# Patient Record
Sex: FEMALE | Race: AMERICAN INDIAN OR ALASKA NATIVE
[De-identification: names, ages, dates, MRNs, and addresses within clinical notes are randomized per-mention and may not be internally consistent; named-entity substitution may affect disease eponyms.]

---

## 2017-04-05 ENCOUNTER — HOSPITAL ENCOUNTER (INPATIENT)
Dept: HOSPITAL 31 - C.ER | Age: 47
LOS: 6 days | Discharge: HOME | DRG: 744 | End: 2017-04-11
Attending: PSYCHIATRY & NEUROLOGY | Admitting: PSYCHIATRY & NEUROLOGY
Payer: MEDICAID

## 2017-04-05 VITALS — OXYGEN SATURATION: 98 %

## 2017-04-05 VITALS — BODY MASS INDEX: 22.2 KG/M2

## 2017-04-05 DIAGNOSIS — F41.9: ICD-10-CM

## 2017-04-05 DIAGNOSIS — F14.90: ICD-10-CM

## 2017-04-05 DIAGNOSIS — B19.10: ICD-10-CM

## 2017-04-05 DIAGNOSIS — F13.90: ICD-10-CM

## 2017-04-05 DIAGNOSIS — F11.23: Primary | ICD-10-CM

## 2017-04-05 DIAGNOSIS — F17.210: ICD-10-CM

## 2017-04-05 DIAGNOSIS — F12.90: ICD-10-CM

## 2017-04-05 DIAGNOSIS — F60.9: ICD-10-CM

## 2017-04-05 DIAGNOSIS — R45.851: ICD-10-CM

## 2017-04-05 DIAGNOSIS — J45.909: ICD-10-CM

## 2017-04-05 DIAGNOSIS — B00.9: ICD-10-CM

## 2017-04-05 DIAGNOSIS — Z95.1: ICD-10-CM

## 2017-04-05 DIAGNOSIS — F32.9: ICD-10-CM

## 2017-04-05 LAB
ALBUMIN/GLOB SERPL: 1.4 {RATIO} (ref 1–2.1)
ALP SERPL-CCNC: 71 U/L (ref 38–126)
ALT SERPL-CCNC: 31 U/L (ref 9–52)
AST SERPL-CCNC: 43 U/L (ref 14–36)
BACTERIA #/AREA URNS HPF: (no result) /[HPF]
BASOPHILS # BLD AUTO: 0.1 K/UL (ref 0–0.2)
BASOPHILS NFR BLD: 1 % (ref 0–2)
BILIRUB SERPL-MCNC: 0.5 MG/DL (ref 0.2–1.3)
BILIRUB UR-MCNC: NEGATIVE MG/DL
BUN SERPL-MCNC: 14 MG/DL (ref 7–17)
CALCIUM SERPL-MCNC: 8.8 MG/DL (ref 8.6–10.4)
CHLORIDE SERPL-SCNC: 98 MMOL/L (ref 98–107)
CO2 SERPL-SCNC: 27 MMOL/L (ref 22–30)
EOSINOPHIL # BLD AUTO: 0.1 K/UL (ref 0–0.7)
EOSINOPHIL NFR BLD: 1.6 % (ref 0–4)
ERYTHROCYTE [DISTWIDTH] IN BLOOD BY AUTOMATED COUNT: 13.6 % (ref 11.5–14.5)
ETHANOL SERPL-MCNC: < 10 MG/DL (ref 0–10)
GLOBULIN SER-MCNC: 3.2 GM/DL (ref 2.2–3.9)
GLUCOSE SERPL-MCNC: 103 MG/DL (ref 65–105)
GLUCOSE UR STRIP-MCNC: NORMAL MG/DL
HCT VFR BLD CALC: 38.9 % (ref 34–47)
KETONES UR STRIP-MCNC: NEGATIVE MG/DL
LEUKOCYTE ESTERASE UR-ACNC: (no result) LEU/UL
LYMPHOCYTES # BLD AUTO: 1.7 K/UL (ref 1–4.3)
LYMPHOCYTES NFR BLD AUTO: 27.6 % (ref 20–40)
MCH RBC QN AUTO: 27 PG (ref 27–31)
MCHC RBC AUTO-ENTMCNC: 32.9 G/DL (ref 33–37)
MCV RBC AUTO: 82.2 FL (ref 81–99)
MONOCYTES # BLD: 0.7 K/UL (ref 0–0.8)
MONOCYTES NFR BLD: 10.4 % (ref 0–10)
NRBC BLD AUTO-RTO: 0 % (ref 0–2)
PH UR STRIP: 5 [PH] (ref 5–8)
PLATELET # BLD: 195 K/UL (ref 130–400)
PMV BLD AUTO: 10.5 FL (ref 7.2–11.7)
POTASSIUM SERPL-SCNC: 3.9 MMOL/L (ref 3.6–5.2)
PROT SERPL-MCNC: 7.6 G/DL (ref 6.3–8.3)
PROT UR STRIP-MCNC: NEGATIVE MG/DL
RBC # UR STRIP: (no result) /UL
RBC #/AREA URNS HPF: 2 /HPF (ref 0–3)
SODIUM SERPL-SCNC: 140 MMOL/L (ref 132–148)
SP GR UR STRIP: 1.01 (ref 1–1.03)
UROBILINOGEN UR-MCNC: NORMAL MG/DL (ref 0.2–1)
WBC # BLD AUTO: 6.3 K/UL (ref 4.8–10.8)
WBC #/AREA URNS HPF: 22 /HPF (ref 0–5)

## 2017-04-05 NOTE — C.PDOC
History Of Present Illness


46 year old female presents to the ED via EMS for drug abuse and public 

intoxication. Patient admits to using Cocaine and Heroin today and has no 

physical complaints at this time


Time Seen by Provider: 04/05/17 03:47


Chief Complaint (Nursing): Substance Abuse


History Per: Patient, EMS


History/Exam Limitations: no limitations


Onset/Duration Of Symptoms: Hrs


Current Symptoms Are (Timing): Still Present


Suicide/Self Injury Attempted (Context): None


Modifying Factor(s): Alcohol, Cocaine, Other (Heroin)


Severity: Mild





Past Medical History


Reviewed: Historical Data, Nursing Documentation, Vital Signs


Vital Signs: 


 Last Vital Signs











Temp  97.6 F   04/05/17 05:55


 


Pulse  57 L  04/05/17 05:55


 


Resp  16   04/05/17 05:55


 


BP  144/79   04/05/17 05:55


 


Pulse Ox  96   04/05/17 06:52














- Medical History


PMH: Asthma, Bipolar Disorder, Depression


Surgical History: CABG





- [facility] Procedures








DETOXIFICATION SERVICES FOR SUBSTANCE ABUSE TREATMENT (01/27/17)


GROUP  FOR SUBSTANCE ABUSE TREATMENT, PSYCHOEDUCATION (12/04/16)


GROUP PSYCHOTHERAPY (01/27/17)


INDIVIDUAL PSYCHOTHERAPY, SUPPORTIVE (01/27/17)


INJECT/INFUSE NEC (01/14/13)


NEBULIZER THERAPY (01/14/13)


VENOUS PUNCTURE NEC (01/14/13)








Family History: States: Unknown Family Hx





- Social History


Hx Tobacco Use: Yes


Hx Alcohol Use: Yes


Hx Substance Use: Yes





- Immunization History


Hx Tetanus Toxoid Vaccination: No


Hx Influenza Vaccination: No


Hx Pneumococcal Vaccination: No





Review Of Systems


Except As Marked, All Systems Reviewed And Found Negative.


Constitutional: Positive for: Other (+Drug use +Intoxication).  Negative for: 

Fever, Chills


Cardiovascular: Negative for: Chest Pain


Respiratory: Negative for: Shortness of Breath


Gastrointestinal: Negative for: Vomiting, Diarrhea





Physical Exam





- Physical Exam


Appears: Non-toxic, No Acute Distress, Unkempt


Skin: Normal Color, Warm, Dry


Head: Atraumatic, Normacephalic


Eye(s): bilateral: Normal Inspection


Oral Mucosa: Moist


Neck: No Midline Cervical Tenderness, No Paracervical Tenderness, Supple


Chest: Symmetrical


Cardiovascular: Rhythm Regular, No Friction Rub, No Murmur


Respiratory: Normal Breath Sounds, No Accessory Muscle Use, No Rales, No Rhonchi

, No Wheezing


Gastrointestinal/Abdominal: Normal Exam, Soft, No Tenderness


Back: Normal Inspection


Extremity: Normal ROM, No Deformity, No Swelling


Neurological/Psych: Oriented x3, Normal Speech, Normal Motor


Gait: Steady





ED Course And Treatment


O2 Sat by Pulse Oximetry: 96 (Room air)


Pulse Ox Interpretation: Normal





Medical Decision Making


Medical Decision Making: 


Upon reevaluation, patient is now awake and sober. She state she tried to jump 

in front of a bus and still has suicidal ideation.


Case discussed with the crisis care manager and patient placed on 1:1.





Plan:


-Blood work


-Urinalysis








Disposition





- Disposition


Referrals: 


Non [facility] Provider, [Primary Care Provider] - 


Disposition Time: 06:51


Condition: STABLE





- Clinical Impression


Clinical Impression: 


 Drug abuse, Suicidal ideation, Depression





- PA / NP / Resident Statement


MD/DO has reviewed & agrees with the documentation as recorded.





- Scribe Statement


The provider has reviewed the documentation as recorded by the Scribe


Louann Bull.





All medical record entries made by the Scribe were at my direction and 

personally dictated by me. I have reviewed the chart and agree that the record 

accurately reflects my personal performance of the history, physical exam, 

medical decision making, and the department course for this patient. I have 

also personally directed, reviewed, and agree with the discharge instructions 

and disposition.





Physician Patient Turnover


Patient Signed Over To: Nancy Saravia (Dr. lu)


Handoff Comments: Pending labs and disposition

## 2017-04-06 PROCEDURE — HZ42ZZZ GROUP COUNSELING FOR SUBSTANCE ABUSE TREATMENT, COGNITIVE-BEHAVIORAL: ICD-10-PCS | Performed by: PSYCHIATRY & NEUROLOGY

## 2017-04-06 PROCEDURE — HZ59ZZZ INDIVIDUAL PSYCHOTHERAPY FOR SUBSTANCE ABUSE TREATMENT, SUPPORTIVE: ICD-10-PCS | Performed by: PSYCHIATRY & NEUROLOGY

## 2017-04-06 PROCEDURE — HZ2ZZZZ DETOXIFICATION SERVICES FOR SUBSTANCE ABUSE TREATMENT: ICD-10-PCS | Performed by: PSYCHIATRY & NEUROLOGY

## 2017-04-06 PROCEDURE — HZ36ZZZ INDIVIDUAL COUNSELING FOR SUBSTANCE ABUSE TREATMENT, PSYCHOEDUCATION: ICD-10-PCS | Performed by: PSYCHIATRY & NEUROLOGY

## 2017-04-06 PROCEDURE — HZ46ZZZ GROUP COUNSELING FOR SUBSTANCE ABUSE TREATMENT, PSYCHOEDUCATION: ICD-10-PCS | Performed by: PSYCHIATRY & NEUROLOGY

## 2017-04-06 PROCEDURE — HZ32ZZZ INDIVIDUAL COUNSELING FOR SUBSTANCE ABUSE TREATMENT, COGNITIVE-BEHAVIORAL: ICD-10-PCS | Performed by: PSYCHIATRY & NEUROLOGY
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
## 2017-04-07 RX ADMIN — GUAIFENESIN PRN MG: 100 SOLUTION ORAL at 12:50

## 2017-04-07 RX ADMIN — GUAIFENESIN PRN MG: 100 SOLUTION ORAL at 18:36

## 2017-04-08 RX ADMIN — GUAIFENESIN PRN MG: 100 SOLUTION ORAL at 09:49

## 2017-04-08 RX ADMIN — GUAIFENESIN PRN MG: 100 SOLUTION ORAL at 00:17

## 2017-04-08 RX ADMIN — GUAIFENESIN PRN MG: 100 SOLUTION ORAL at 02:10

## 2017-04-08 RX ADMIN — GUAIFENESIN PRN MG: 100 SOLUTION ORAL at 20:46

## 2017-04-08 NOTE — PCM.PYCHPN
Psychiatric Progress Note





- Psychiatric Progress Note


Patient seen today, length of contact: 17 min


Patient Chief Complaint: 


"I was depressed"


Problems Identified/Issues Discussed: 


The pt is seen, chart reviewed, case discussed with staff.


The pt is compliant with medications and reports no side-effects.


Symptoms are improving but needs more time to stabilize. Still anxious, frail, 

med-seeking


After care discussed, support and psychoeducation given.


MI and CBT used briefly.


Medication Change: Yes (detox changes daily)


Medical Record Reviewed: Yes





Mental Status Examination





- Cognitive Function


Orientation: Person, Place, Situation, Time


Memory: Impaired


Attention: Poor


Concentration: Poor


Association: WNL


Fund of Knowledge: Poor





- Mood


Mood: Depressed, Anxious





- Affect


Affect: Constricted





- Speech


Speech: Slurred





- Formal Thought Process


Formal Thought Process: Loosening of associations





- Suicidal Ideation


Suicidal Ideation: No





- Homicidal Ideation


Homicidal Ideation: No





Goal/Treatment Plan





- Goal/Treatment Plan


Need for Continued Stay: Discharge may exacerbated symptoms, Severe functional 

impairment


Progress Toward Problem(s) and Goals/Treatment Plan: 


Depression:


-Lexapro


-Support and psychoeducation


-CBT


-Attend groups and activities





Opioids:


-Methadone detox next line-as needed medications


-Attend groups and activities


-MI for abstinence





Cannabis, benzos and cocaine: 


-gabapentin


-MI for abstinence





Estimated Date of D/C: 04/11/17

## 2017-04-08 NOTE — PCM.PYCHPN
Psychiatric Progress Note





- Psychiatric Progress Note


Patient seen today, length of contact: 17 min


Patient Chief Complaint: 





I am feeling a little better


Problems Identified/Issues Discussed: 





The pt is seen, chart reviewed, case discussed with staff.


The pt is compliant with medications and reports no side-effects.


Symptoms are improving but needs more time to stabilize. 


Pt. reports poor sleep, and she reports depressed mood and remained isolated.


After care discussed, support and psychoeducation given.  Interested in 

[facility].


Medication Change: Yes (detox changes daily)


Medical Record Reviewed: Yes





Mental Status Examination





- Cognitive Function


Orientation: Person, Place, Situation, Time


Memory: Impaired


Attention: WNL


Concentration: WNL


Association: WNL


Fund of Knowledge: Poor





- Mood


Mood: Depressed, Anxious





- Affect


Affect: Constricted





- Speech


Speech: Slurred





- Formal Thought Process


Formal Thought Process: No Impairment





- Suicidal Ideation


Suicidal Ideation: No





- Homicidal Ideation


Homicidal Ideation: No





Goal/Treatment Plan





- Goal/Treatment Plan


Need for Continued Stay: Discharge may exacerbated symptoms, Severe functional 

impairment


Progress Toward Problem(s) and Goals/Treatment Plan: 


Primary: Major Depressive Disorder - single/severe without psychosis


Opioid use d/o - severe


Opioid withdrawal


Cocaine use d/o - moderate


Cannabis Use d/o - mild


Sedative hypnotic use d/o - mild


r/o personality d/o unspecified











Depression:


-Lexapro


-Support and psychoeducation


-CBT


-Attend groups and activities





Opioids:


-Methadone detox next line-as needed medications


-Attend groups and activities


-MI for abstinence





Cannabis, benzos and cocaine: 


-gabapentin


-MI for abstinence


Estimated Date of D/C: 04/11/17





- Smoking Cessation


Smoking Cessation Initiated: No

## 2017-04-09 RX ADMIN — GUAIFENESIN PRN MG: 100 SOLUTION ORAL at 10:26

## 2017-04-09 RX ADMIN — GUAIFENESIN PRN MG: 100 SOLUTION ORAL at 14:13

## 2017-04-09 NOTE — PCM.PYCHPN
Psychiatric Progress Note





- Psychiatric Progress Note


Patient seen today, length of contact: 15 min


Patient Chief Complaint: 





I am feeling much better


Problems Identified/Issues Discussed: 





The pt is seen, chart reviewed, case discussed with staff.


The pt is compliant with medications and reports no side-effects.


Pt. reports improvement in her sleep and depressed mood. Her symptoms are 

improving but needs more time to stabilize. 


After care discussed, support and psychoeducation given.  Interested in 

[facility].


Medication Change: Yes (detox changes daily)


Medical Record Reviewed: Yes





Mental Status Examination





- Cognitive Function


Orientation: Person, Place, Situation, Time


Memory: Impaired


Attention: WNL


Concentration: WNL


Association: WNL


Fund of Knowledge: WNL





- Mood


Mood: Anxious





- Affect


Affect: Constricted





- Speech


Speech: Soft





- Formal Thought Process


Formal Thought Process: No Impairment





- Suicidal Ideation


Suicidal Ideation: No





- Homicidal Ideation


Homicidal Ideation: No





Goal/Treatment Plan





- Goal/Treatment Plan


Need for Continued Stay: Discharge may exacerbated symptoms, Severe functional 

impairment, Other


Progress Toward Problem(s) and Goals/Treatment Plan: 


Primary: Major Depressive Disorder - single/severe without psychosis


Opioid use d/o - severe


Opioid withdrawal


Cocaine use d/o - moderate


Cannabis Use d/o - mild


Sedative hypnotic use d/o - mild


r/o personality d/o unspecified











Depression:


-Lexapro


-Support and psychoeducation


-CBT


-Attend groups and activities





Opioids:


-Methadone detox next line-as needed medications


-Attend groups and activities


-MI for abstinence





Cannabis, benzos and cocaine: 


-gabapentin


-MI for abstinence


Estimated Date of D/C: 04/11/17





- Smoking Cessation


Smoking Cessation Initiated: No

## 2017-04-10 RX ADMIN — GUAIFENESIN PRN MG: 100 SOLUTION ORAL at 21:04

## 2017-04-10 RX ADMIN — GUAIFENESIN PRN MG: 100 SOLUTION ORAL at 10:55

## 2017-04-10 NOTE — PCM.PYCHPN
Psychiatric Progress Note





- Psychiatric Progress Note


Patient seen today, length of contact: 17 min


Patient Chief Complaint: 


"I need more meds"


Problems Identified/Issues Discussed: 


The pt is seen, chart reviewed, case discussed with staff.


The pt is compliant with medications and reports no side-effects.


Symptoms are improving but needs more time to stabilize. 


Pt. reports poor sleep, and she reports feeling sluggish. Inquires about 

increasing dose of trazadone. 


After care discussed, support and psychoeducation given.  Interested in 

[facility].


Medication Change: Yes (detox changes daily, increase trazodone)


Medical Record Reviewed: Yes





Mental Status Examination





- Cognitive Function


Orientation: Person, Place, Situation, Time


Memory: Impaired


Attention: Poor


Concentration: Poor


Association: WNL


Fund of Knowledge: Poor





- Mood


Mood: Depressed, Anxious





- Affect


Affect: Constricted





- Speech


Speech: Appropriate





- Formal Thought Process


Formal Thought Process: Loosening of associations





- Suicidal Ideation


Suicidal Ideation: No





- Homicidal Ideation


Homicidal Ideation: No





Goal/Treatment Plan





- Goal/Treatment Plan


Need for Continued Stay: Discharge may exacerbated symptoms, Severe functional 

impairment


Progress Toward Problem(s) and Goals/Treatment Plan: 


Depression:


-Lexapro


-Support and psychoeducation


-CBT


-Attend groups and activities





Opioids:


-Methadone detox next line-as needed medications


-Attend groups and activities


-MI for abstinence





Cannabis, benzos and cocaine: 


-gabapentin


-MI for abstinence





Estimated Date of D/C: 04/11/17

## 2017-04-11 VITALS
HEART RATE: 74 BPM | SYSTOLIC BLOOD PRESSURE: 95 MMHG | RESPIRATION RATE: 18 BRPM | DIASTOLIC BLOOD PRESSURE: 62 MMHG | TEMPERATURE: 97.8 F
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
## 2018-05-21 ENCOUNTER — HOSPITAL ENCOUNTER (INPATIENT)
Dept: HOSPITAL 31 - C.ER | Age: 48
LOS: 8 days | Discharge: HOME | DRG: 430 | End: 2018-05-29
Attending: PSYCHIATRY & NEUROLOGY | Admitting: PSYCHIATRY & NEUROLOGY
Payer: COMMERCIAL

## 2018-05-21 VITALS — BODY MASS INDEX: 22.2 KG/M2

## 2018-05-21 DIAGNOSIS — Z95.1: ICD-10-CM

## 2018-05-21 DIAGNOSIS — F11.23: ICD-10-CM

## 2018-05-21 DIAGNOSIS — I10: ICD-10-CM

## 2018-05-21 DIAGNOSIS — Z95.2: ICD-10-CM

## 2018-05-21 DIAGNOSIS — F31.64: Primary | ICD-10-CM

## 2018-05-21 DIAGNOSIS — R45.851: ICD-10-CM

## 2018-05-21 DIAGNOSIS — F12.90: ICD-10-CM

## 2018-05-21 DIAGNOSIS — F14.20: ICD-10-CM

## 2018-05-21 DIAGNOSIS — L30.9: ICD-10-CM

## 2018-05-21 DIAGNOSIS — J45.909: ICD-10-CM

## 2018-05-21 DIAGNOSIS — Z91.14: ICD-10-CM

## 2018-05-21 LAB
ALBUMIN SERPL-MCNC: 4.3 G/DL (ref 3.5–5)
ALBUMIN/GLOB SERPL: 1.3 {RATIO} (ref 1–2.1)
ALT SERPL-CCNC: 26 U/L (ref 9–52)
AST SERPL-CCNC: 23 U/L (ref 14–36)
BASOPHILS # BLD AUTO: 0.1 K/UL (ref 0–0.2)
BASOPHILS NFR BLD: 1.1 % (ref 0–2)
BILIRUB UR-MCNC: NEGATIVE MG/DL
BUN SERPL-MCNC: 20 MG/DL (ref 7–17)
CALCIUM SERPL-MCNC: 9.5 MG/DL (ref 8.6–10.4)
EOSINOPHIL # BLD AUTO: 0.1 K/UL (ref 0–0.7)
EOSINOPHIL NFR BLD: 2 % (ref 0–4)
ERYTHROCYTE [DISTWIDTH] IN BLOOD BY AUTOMATED COUNT: 13.5 % (ref 11.5–14.5)
GFR NON-AFRICAN AMERICAN: 59
GLUCOSE UR STRIP-MCNC: NORMAL MG/DL
HCG,QUALITATIVE URINE: NEGATIVE
HGB BLD-MCNC: 12.7 G/DL (ref 11–16)
HYALINE CASTS #/AREA URNS LPF: (no result) /LPF (ref 0–2)
LEUKOCYTE ESTERASE UR-ACNC: (no result) LEU/UL
LYMPHOCYTES # BLD AUTO: 1.7 K/UL (ref 1–4.3)
LYMPHOCYTES NFR BLD AUTO: 23.7 % (ref 20–40)
MCH RBC QN AUTO: 28.5 PG (ref 27–31)
MCHC RBC AUTO-ENTMCNC: 34.1 G/DL (ref 33–37)
MCV RBC AUTO: 83.4 FL (ref 81–99)
MONOCYTES # BLD: 0.7 K/UL (ref 0–0.8)
MONOCYTES NFR BLD: 9.6 % (ref 0–10)
NEUTROPHILS # BLD: 4.6 K/UL (ref 1.8–7)
NEUTROPHILS NFR BLD AUTO: 63.6 % (ref 50–75)
NRBC BLD AUTO-RTO: 0 % (ref 0–2)
PH UR STRIP: 5 [PH] (ref 5–8)
PLATELET # BLD: 138 K/UL (ref 130–400)
PMV BLD AUTO: 11.1 FL (ref 7.2–11.7)
PROT UR STRIP-MCNC: NEGATIVE MG/DL
RBC # BLD AUTO: 4.47 MIL/UL (ref 3.8–5.2)
RBC # UR STRIP: NEGATIVE /UL
SP GR UR STRIP: 1.01 (ref 1–1.03)
SQUAMOUS EPITHIAL: 1 /HPF (ref 0–5)
UROBILINOGEN UR-MCNC: NORMAL MG/DL (ref 0.2–1)
WBC # BLD AUTO: 7.3 K/UL (ref 4.8–10.8)

## 2018-05-21 NOTE — C.PDOC
History Of Present Illness


48 year old female with a long history of heroin dependency and schizophrenia 

presents to the emergency department after being dropped off by a friend with 

complaints and auditory hallucinations and voices saying "strange things". 

Patient reports she feels frightened, but denies alcohol or use suicidal 

thoughts at the moment but states she has had them in the past. Patient is 

requesting to be seen by psych, and is also requesting detox. Patient is non-

compliant with her medications. 


Chief Complaint (Nursing): Psychiatric Evaluation


History Per: Patient


History/Exam Limitations: no limitations


Onset/Duration Of Symptoms: Hrs


Current Symptoms Are (Timing): Still Present


Suicide/Self Injury Attempted (Context): None


Modifying Factor(s): Other (heroin)


Associated Symptoms: Other (fright).  denies: Suicidal Thoughts, Suicidal Plan





Past Medical History


Reviewed: Historical Data, Nursing Documentation, Vital Signs


Vital Signs: 


 Last Vital Signs











Temp  97.9 F   05/22/18 00:56


 


Pulse  82   05/22/18 00:56


 


Resp  18   05/22/18 04:14


 


BP  131/89   05/22/18 00:56


 


Pulse Ox  100   05/22/18 02:59














- Medical History


PMH: Asthma, Bipolar Disorder, Depression, HTN, Mitral Valve Prolapse, 

Schizophrenia


   Denies: Diabetes, Hepatitis, HIV, Chronic Kidney Disease, Seizures, Sexually 

Transmitted Disease


Surgical History: CABG





- [facility] Procedures








DETOXIFICATION SERVICES FOR SUBSTANCE ABUSE TREATMENT (04/05/17)


GROUP  FOR SUBSTANCE ABUSE TREATMENT, PSYCHOEDUCATION (04/05/17)


GROUP  FOR SUBSTANCE ABUSE, COGNITIVE BEHAVIORAL (04/05/17)


GROUP PSYCHOTHERAPY (01/27/17)


INDIV  FOR SUBSTANCE ABUSE TREATMENT, PSYCHOEDUCATION (04/05/17)


INDIV  FOR SUBSTANCE ABUSE, COGNITIVE BEHAVIORAL (04/05/17)


[facility] PSYCHOTHERAPY FOR SUBSTANCE ABUSE TREATMENT, SUPPORT (04/05/17)


INDIVIDUAL PSYCHOTHERAPY, SUPPORTIVE (01/27/17)


INJECT/INFUSE NEC (01/14/13)


NEBULIZER THERAPY (01/14/13)


VENOUS PUNCTURE NEC (01/14/13)








Family History: States: No Known Family Hx





- Social History


Hx Tobacco Use: Yes


Hx Alcohol Use: Yes


Hx Substance Use: Yes





- Immunization History


Hx Tetanus Toxoid Vaccination: No


Hx Influenza Vaccination: No


Hx Pneumococcal Vaccination: No





Review Of Systems


Constitutional: Positive for: Other (fright)


Neurological: Positive for: Other (auditory hallucinations)


Psych: Positive for: Anxiety.  Negative for: Suicidal ideation





Physical Exam





- Physical Exam


Appears: Non-toxic, In Acute Distress (anxious, pleading not to be left alone), 

Unkempt (poor general hygiene)


Skin: Normal Color, Warm, Dry


Head: Atraumatic, Normacephalic


Eye(s): bilateral: Normal Inspection


Oral Mucosa: Moist


Neck: Supple


Cardiovascular: Rhythm Regular


Respiratory: Normal Breath Sounds, No Rales, No Rhonchi, No Wheezing


Gastrointestinal/Abdominal: Normal Exam, Soft, No Tenderness, No Mass, No 

Guarding, No Rebound


Extremity: Other (no sign of IV drug use noted)


Extremity: Bilateral: Atraumatic


Pulses: Left Dorsalis Pedis: Normal, Right Dorsalis Pedis: Normal


Neurological/Psych: Oriented x3, Normal Speech, Normal Cognition, Other (

hyperkinetic, no focal deficits)





ED Course And Treatment





- Laboratory Results


Result Diagrams: 


 05/21/18 23:11





 05/21/18 23:11


O2 Sat by Pulse Oximetry: 100 (RA)


Pulse Ox Interpretation: Normal





Medical Decision Making


Medical Decision Making: 


Plan:


Alcohol Serum


CMP


Drug Screen


CBC


Desyrel 50mg PO


Urinalysis 





Impression:


Substance abuse, auditory hallucinations








Disposition





- Disposition


Disposition: HOSPITALIZED


Disposition Time: 05:51


Condition: GOOD





- Clinical Impression


Clinical Impression: 


 Opioid dependence, Depression








- Scribe Statement


The provider has reviewed the documentation as recorded by the Scribe (Suraj Marcelino)


Provider Attestation: 


All medical record entries made by the Scribe were at my direction and 

personally dictated by me. I have reviewed the chart and agree that the record 

accurately reflects my personal performance of the history, physical exam, 

medical decision making, and the department course for this patient. I have 

also personally directed, reviewed, and agree with the discharge instructions 

and disposition.

## 2018-05-22 PROCEDURE — GZ58ZZZ INDIVIDUAL PSYCHOTHERAPY, COGNITIVE-BEHAVIORAL: ICD-10-PCS | Performed by: PSYCHIATRY & NEUROLOGY

## 2018-05-22 PROCEDURE — HZ46ZZZ GROUP COUNSELING FOR SUBSTANCE ABUSE TREATMENT, PSYCHOEDUCATION: ICD-10-PCS | Performed by: PSYCHIATRY & NEUROLOGY

## 2018-05-22 PROCEDURE — HZ56ZZZ INDIVIDUAL PSYCHOTHERAPY FOR SUBSTANCE ABUSE TREATMENT, PSYCHOEDUCATION: ICD-10-PCS | Performed by: PSYCHIATRY & NEUROLOGY

## 2018-05-22 PROCEDURE — HZ42ZZZ GROUP COUNSELING FOR SUBSTANCE ABUSE TREATMENT, COGNITIVE-BEHAVIORAL: ICD-10-PCS | Performed by: PSYCHIATRY & NEUROLOGY

## 2018-05-22 PROCEDURE — HZ2ZZZZ DETOXIFICATION SERVICES FOR SUBSTANCE ABUSE TREATMENT: ICD-10-PCS | Performed by: PSYCHIATRY & NEUROLOGY

## 2018-05-22 PROCEDURE — HZ52ZZZ INDIVIDUAL PSYCHOTHERAPY FOR SUBSTANCE ABUSE TREATMENT, COGNITIVE-BEHAVIORAL: ICD-10-PCS | Performed by: PSYCHIATRY & NEUROLOGY

## 2018-05-22 PROCEDURE — GZ56ZZZ INDIVIDUAL PSYCHOTHERAPY, SUPPORTIVE: ICD-10-PCS | Performed by: PSYCHIATRY & NEUROLOGY

## 2018-05-22 PROCEDURE — GZHZZZZ GROUP PSYCHOTHERAPY: ICD-10-PCS | Performed by: PSYCHIATRY & NEUROLOGY

## 2018-05-22 PROCEDURE — HZ59ZZZ INDIVIDUAL PSYCHOTHERAPY FOR SUBSTANCE ABUSE TREATMENT, SUPPORTIVE: ICD-10-PCS | Performed by: PSYCHIATRY & NEUROLOGY

## 2018-05-22 NOTE — PCM.BM
<Yan Collazo - Last Filed: 05/22/18 01:43>





Treatment Plan Problems





- Problems identified on initial assessmt


  ** DEPRESSION


Date Initiated: 05/22/18


Time Initiated: 01:35


Assessment reference: NA


Status: Active





  ** SUBSTANCE ABUSE


Date Initiated: 05/22/18


Time Initiated: 01:35


Assessment reference: NA


Status: Active





Treatment assets and liabiliti


Patient Assests: adapts well, cooperative, self-reliant, ADL independent


Patient Liabilities: financial problems, poor support system, substance abuse





- Milieu Protocol


Maintain good personal hygiene: daily Encourage regular showers, daily Remind 

patient to perform daily oral care, daily Assist patient to perform ADL's


Maintain personal safety: every shift Educate patient to report safety concerns 

to staff, every shift Monitor environment for contraband/sharps


Medication safety: Monitor for expected outcome, potential side effects: every 

shift, Assess barriers to learning: every shift, Assess readiness for 

medication education: every shift





<Dee Sosa - Last Filed: 05/23/18 17:08>





Family Contact


Family involvement: Patient does not wish Family/SO involvement


Family contact: Patient declines to allow family contact at present





- Goals for Treatment


Patient goals for treatment: "I want to go anywhere that has subaxone treatment.

"



Discharge/Continuing Care





- Education Needs


Education Needs: Patient Medication, Patient Diagnosis/Disease Process, Patient 

Coping Skills, Patient Community resources





- Discharge


Discharge Criteria: Free of Suicidal thoughts, Normal sleep pattern, Ability to 

care for self, No longer exhibiting s/s of withdrawal, Reduction of target 

symptoms


Discharge to:: Home





- Treatment Team Participation


Discussed with Family/SO: No


Was Patient/Family/SO present at Treatment Team Meeting: Yes

## 2018-05-22 NOTE — PCM.PSYCH
Initial Psychiatric Evaluation





- Initial Psychiatric Evaluation


Type of Admission: Voluntary


Legal Status: Capacity


Chief Complaint (in patient's own words): 





I was feeling depressed.'


History of Present Illness and Precipitating Events: 








Pt is 48 year old AA female presented to the ED with complains of feeling 

depressed and auditory hallucinations and voices. 





The patient has a long history of heroin dependency and schizophrenia. The 

voices has been telling her yesterday to fly like batman, just fly away. 

Patient reports feeling frightened by the voices, but denies having suicidal 

thoughts. She did report having suicidal thoughts in the past however. 

Yesterday she reports having 4 bags of heroin intranasally. Normally she has 10 

bags of heroin a day. She is experiencing withdrawal symptoms including 

irritability, stomach cramps, nausea, diarrhea, and overall restless. She also 

complains of feeling itchy around her lips, neck, and arms. Patient reports 

having a depressed mood. She says he cannot sleep, doesnt have an appetite, 

cannot concentrate, feeling guilty of her actions, and not being interested in 

doing anything. She has tried rehab programs in [place] for Suboxone and in 

[place] ([facility]) for Methadone but claims she has stopped going for 

unknown reasons. She reports having using heroin for 20 years and smoking 

cocaine for 15 years. She also uses marijuana on occasion. She drinks 2 shots 

of alcohol and 2 beers usually a day. 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 

and drugs by being a prostitute. She was admitted last month with Major 

Depressive Disorder with suicidal ideations. After her last admission she went 

ot [facility] for a methadone rehab program. Patient also reports her parents 

experienced depression and anxiety. 





Past Medical History: Asthma, HTM, Mitral Valve Prolapse, 


Past Surgical History: CABG


Allergies: Denies





Current Medications: 





Active Medications











Generic Name Dose Route Start Last Admin





  Trade Name Freq  PRN Reason Stop Dose Admin


 


Albuterol  1 puff  05/22/18 04:40  





  Ventolin Hfa 90 Mcg/Actuation (8 G)  INH   





  RQ6 PRN   





  Shortness of Breath   


 


Trazodone HCl  50 mg  05/22/18 02:15  05/22/18 03:20





  Desyrel  PO   50 mg





  [facility]   Administration














Past Psychiatric History





- Past Psychiatric History


Previous Treatment History: Inpatient


Pertinent Medical Hx (Current Medical&Sleep Prob, Allergies): 





 Allergies











Allergy/AdvReac Type Severity Reaction Status Date / Time


 


FISH Allergy   Verified 04/05/17 03:39


 


mushroom Allergy  RASH Uncoded 04/05/17 03:39








 





Albuterol HFA [Ventolin HFA 90 mcg/actuation (8 g)] 1 puff INH RQ4 PRN #1 

inhaler 04/11/17 


Escitalopram [Lexapro] 10 mg PO DAILY #30 tab 04/11/17 


Gabapentin [Neurontin] 600 mg PO TID #90 cap 04/11/17 


traZODone [Desyrel] 150 mg PO HS #30 tab 04/11/17 











Review of Systems





- Review of Systems


All systems: reviewed and no additional remarkable complaints except





- Psychiatric


Psychiatric: Anxiety, Auditory Hallucinations, Irritability, Mood Swings, 

Paranoia, Suicidal Ideation





Mental Status Examination





- Personal Presentation


Personal Presentation: Looks stated age





- Affect


Affect: Broad





- Motor Activity


Motor Activity: Psychomotor Agitation





- Reliability in Providing Information


Reliability in Providing Information: Fair





- Speech


Speech: Organized





- Mood


Mood: Depressed, Anxious





- Formal Thought Process


Formal Thought Process: Hallucinations, Delusions, Paranoia, Flight of ideas





- Hallucinations/Delusions


Hallucinations: Auditory





- Obsessions/Compulsions


Obsessions: No


Compulsions: No





- Cognitive Functions


Orientation: Person, Place, Situation, Time


Sensorium: Alert


Attention/Concentration: Attentive


Abstract Thinking: [place]


Estimate of Intelligence: Below average


Judgement: Imparied, as evidence by: Poor judgement, Imparied, as evidence by: 

Lack of insight into illness





- Risk


Risk: Suicidal, Withdrawal, Diminished functioning





- Limitations


Limitations: Living alone





DSM 5 DX





- DSM 5


DSM 5 Diagnosis: 








Bipolar disorder mixed severe with psychotic features


R/O Schizoaffective disorder Bipolar type


Opioid use disorder severe 


Opioid withdrawal


Cocaine use disorder severe








- Recommended/Plan of Treatment


Treatment Recommendations and Plan of Treatment: 











Bipolar disorder mixed severe with psychotic features


R/O Schizoaffective disorder Bipolar type


-CBT   


-Psychoeducation


-Supportive therapy, group therapy, individual therapy


-Trazodone 50 mg by mouth daily at bedtime


-Gabapentin 100 mg po TID


-Lexapro 10 mg po daily





Opioid use disorder severe


-CBT


-Psychoeducation


-Supportive therapy, individual therapy


-Use MI for abstinence





Opioid withdrawal 


-CBT   


-Psychoeducation


-Supportive therapy, individual therapy


-Clonidine when necessary


-Methadone taper





Cocaine use disorder severe


-Psychoeducation


-Supportive therapy, individual therapy


-Use MI for abstinence





Skin Rash


-Consult medicine





Asthma


-Continue prescribed medications








- Smoking Cessation


Smoking Cessation Initiated: No

## 2018-05-22 NOTE — PCM.PSYCH
Initial Psychiatric Evaluation





- Initial Psychiatric Evaluation


Type of Admission: Voluntary


Legal Status: Capacity


Current Medications: 





Active Medications











Generic Name Dose Route Start Last Admin





  Trade Name Freq  PRN Reason Stop Dose Admin


 


Albuterol  1 puff  05/22/18 04:40  





  Ventolin Hfa 90 Mcg/Actuation (8 G)  INH   





  RQ6 PRN   





  Shortness of Breath   


 


Trazodone HCl  50 mg  05/22/18 02:15  05/22/18 03:20





  Desyrel  PO   50 mg





  HS MARLA   Administration














Past Psychiatric History





- Past Psychiatric History


Pertinent Medical Hx (Current Medical&Sleep Prob, Allergies): 





 Allergies











Allergy/AdvReac Type Severity Reaction Status Date / Time


 


FISH Allergy   Verified 04/05/17 03:39


 


mushroom Allergy  RASH Uncoded 04/05/17 03:39








 





Albuterol HFA [Ventolin HFA 90 mcg/actuation (8 g)] 1 puff INH RQ4 PRN #1 

inhaler 04/11/17 


Escitalopram [Lexapro] 10 mg PO DAILY #30 tab 04/11/17 


Gabapentin [Neurontin] 600 mg PO TID #90 cap 04/11/17 


traZODone [Desyrel] 150 mg PO HS #30 tab 04/11/17

## 2018-05-23 RX ADMIN — CLOTRIMAZOLE AND BETAMETHASONE DIPROPIONATE SCH TUBE: 10; .5 CREAM TOPICAL at 14:21

## 2018-05-23 RX ADMIN — ALBUTEROL SULFATE PRN U: 90 AEROSOL, METERED RESPIRATORY (INHALATION) at 10:24

## 2018-05-23 NOTE — CP.PCM.CON
<Jeri Umaña - Last Filed: 05/23/18 16:19>





History of Present Illness





- History of Present Illness


History of Present Illness: 





Medicine Consult Note for Hospitalist Service- Dr. Conde





Reason for consult: Rash 





HPI: This is 48 year old female with PMHx of Asthma, Heroine and Cocaine Use is 

admitted to for depression, auditory or visual hallucinations. Medicine was 

consulted for a rash that is localized on bilateral dorsal aspects of her 

forearms and on the posterior aspect of her neck. She reports she started to 

experience pruritus 2 weeks ago that has not resolved. She was previously 

admitted to a detox unit in [place] - where she was treated with Bactrim and 

Lotrisone which improved her symptoms. This has happened to her a few years ago

, which self resolved. Denied any change in detergent, clothing, or any unusual 

contact on her skin. Denied fever, chills, headache, shortness of breath, chest 

pain, abdominal pain, n/v/d/c, or urinary symptoms. 





PMHx: Asthma, Heroine and Cocaine Use 


PSHx: Mitral Valve Replacement x 2 2009, 2014


Meds: As per MAR


All: NKDA


SHx: Admits to tobacco, heroine (intranasally) and cocaine use (smokes)


FHx: Unremarkable 











Past Patient History





- Infectious Disease


Hx of Infectious Diseases: None





- Tetanus Immunizations


Tetanus Immunization: Unknown





- Past Social History


Smoking Status: Heavy Smoker > 10 Cigarettes Daily





- CARDIAC


Hx Hypertension: Yes


Hx Mitral Valve Prolapse: Yes





- PULMONARY


Hx Asthma: Yes





- NEUROLOGICAL


Hx Seizures: No





- RENAL


Hx Chronic Kidney Disease: No





- ENDOCRINE/METABOLIC


Hx Endocrine Disorders: No





- HEMATOLOGICAL/ONCOLOGICAL


Hx Human Immunodeficiency Virus (HIV): No





- INTEGUMENTARY


Hx Dermatological Problems: No





- MUSCULOSKELETAL/RHEUMATOLOGICAL


Hx Musculoskeletal Disorders: No





- GASTROINTESTINAL


Hx Gastrointestinal Disorders: No





- GENITOURINARY/GYNECOLOGICAL


Hx Sexually Transmitted Disorders: No





- PSYCHIATRIC


Hx Substance Use: Yes





- SURGICAL HISTORY


Hx Coronary Artery Bypass Graft: Yes





- ANESTHESIA


Hx Anesthesia: Yes


Hx Anesthesia Reactions: No


Hx Malignant Hyperthermia: No





Meds


Allergies/Adverse Reactions: 


 Allergies











Allergy/AdvReac Type Severity Reaction Status Date / Time


 


FISH Allergy   Verified 04/05/17 03:39


 


mushroom Allergy  RASH Uncoded 04/05/17 03:39














- Medications


Medications: 


 Current Medications





Al Hydrox/Mg Hydrox/Simethicone (Maalox 30 Ml)  30 ml PO TID PRN


   PRN Reason: Indigestion / Heartburn


Albuterol (Ventolin Hfa 90 Mcg/Actuation (8 G))  1 puff INH RQ6 PRN


   PRN Reason: Shortness of Breath


   Last Admin: 05/23/18 10:24 Dose:  1 u


Albuterol (Ventolin Hfa 90 Mcg/Actuation (8 G))  1 puff INH RQ4 PRN


   PRN Reason: SOB


Clonidine HCl (Catapres)  0.1 mg PO Q8 PRN


   PRN Reason: COWS Score More or Equal to 5


Escitalopram Oxalate (Lexapro)  10 mg PO DAILY [facility]


   Last Admin: 05/23/18 10:36 Dose:  10 mg


Gabapentin (Neurontin)  100 mg PO TID [facility]


   Last Admin: 05/23/18 10:36 Dose:  100 mg


Hydroxyzine HCl (Atarax)  25 mg PO Q6H PRN


   PRN Reason: Anxiety


Loperamide HCl (Imodium)  2 mg PO Q8 PRN


   PRN Reason: Diarrhea


Methadone HCl (Methadone)  15 mg PO DAILY [facility]


   PRN Reason: Taper


   Stop: 05/26/18 09:59


   Last Admin: 05/23/18 09:34 Dose:  15 mg


Ondansetron HCl (Zofran Tab)  4 mg PO Q8 PRN


   PRN Reason: Nausea/Vomiting


Pseudoephedrine HCl (Sudafed Tab)  60 mg PO QID PRN


   PRN Reason: Nasal/Sinus Congestion


Trazodone HCl (Desyrel)  50 mg PO [facility]


   Last Admin: 05/22/18 21:38 Dose:  50 mg











Physical Exam





- Constitutional


Appears: No Acute Distress





- Head Exam


Head Exam: NORMAL INSPECTION, NORMOCEPHALIC





- Eye Exam


Eye Exam: EOMI, Normal appearance, PERRL


Pupil Exam: NORMAL ACCOMODATION





- ENT Exam


ENT Exam: Mucous Membranes Moist, Normal Exam





- Neck Exam


Additional comments: 





eczema noted on nape of neck, bilateral dorsal aspects of her forearm 





- Respiratory Exam


Respiratory Exam: Clear to Auscultation Bilateral, NORMAL BREATHING PATTERN.  

absent: Wheezes





- Cardiovascular Exam


Cardiovascular Exam: REGULAR RHYTHM, RRR





- GI/Abdominal Exam


GI & Abdominal Exam: Normal Bowel Sounds, Soft.  absent: Distended, Tenderness





- Extremities Exam


Extremities exam: Positive for: pedal pulses present.  Negative for: calf 

tenderness, pedal edema, tenderness


Additional comments: 








eczema noted on nape of neck, bilateral dorsal aspects of her forearm 





- Neurological Exam


Neurological exam: Alert, CN II-XII Intact, Oriented x3





- Psychiatric Exam


Psychiatric exam: Normal Affect, Normal Mood





- Skin


Skin Exam: Dry, Intact, Normal Color, Warm





Results





- Vital Signs


Recent Vital Signs: 


 Last Vital Signs











Temp  97.5 F L  05/23/18 06:57


 


Pulse  59 L  05/23/18 06:57


 


Resp  20   05/23/18 06:57


 


BP  114/71   05/23/18 06:57


 


Pulse Ox  100   05/22/18 05:52














- Labs


Result Diagrams: 


 05/21/18 23:11





 05/21/18 23:11





Assessment & Plan





- Assessment and Plan (Free Text)


Assessment: 





his is 48 year old female with PMHx of Asthma, Heroine and Cocaine Use is 

admitted to for depression, auditory or visual hallucinations. Medicine was 

consulted for a rash that is localized on bilateral dorsal aspects of her 

forearms and on the posterior aspect of her neck. 


Plan: 





Eczema 


- Hx of Asthma 


- Started on Lotrisone Q12H 


- Will continue to monitor progression, may need systemic steroids if does not 

improve 





Hx Depression


- All management as per Psychiatry





Hx of Heroine, Cocaine Use


- All management as per Psychiatry





DW Dr. Conde,


Jeri Umaña DO, PGY-1 





<Ronit Conde - Last Filed: 05/23/18 18:57>





Meds





- Medications


Medications: 


 Current Medications





Al Hydrox/Mg Hydrox/Simethicone (Maalox 30 Ml)  30 ml PO TID PRN


   PRN Reason: Indigestion / Heartburn


Albuterol (Ventolin Hfa 90 Mcg/Actuation (8 G))  1 puff INH RQ6 PRN


   PRN Reason: Shortness of Breath


   Last Admin: 05/23/18 10:24 Dose:  1 u


Betamethasone/Clotrimazole (Lotrisone)  1 gm TOP Q12H [facility]


   Last Admin: 05/23/18 14:21 Dose:  1 tube


Clonidine HCl (Catapres)  0.1 mg PO Q8 PRN


   PRN Reason: COWS Score More or Equal to 5


Escitalopram Oxalate (Lexapro)  10 mg PO DAILY [facility]


   Last Admin: 05/23/18 10:36 Dose:  10 mg


Gabapentin (Neurontin)  100 mg PO TID [facility]


   Last Admin: 05/23/18 17:12 Dose:  100 mg


Hydroxyzine HCl (Atarax)  25 mg PO Q6H PRN


   PRN Reason: Anxiety


Loperamide HCl (Imodium)  2 mg PO Q8 PRN


   PRN Reason: Diarrhea


Methadone HCl (Methadone)  15 mg PO DAILY [facility]


   PRN Reason: Taper


   Stop: 05/26/18 09:59


   Last Admin: 05/23/18 09:34 Dose:  15 mg


Ondansetron HCl (Zofran Tab)  4 mg PO Q8 PRN


   PRN Reason: Nausea/Vomiting


Pseudoephedrine HCl (Sudafed Tab)  60 mg PO QID PRN


   PRN Reason: Nasal/Sinus Congestion


Trazodone HCl (Desyrel)  100 mg PO [facility]











Results





- Vital Signs


Recent Vital Signs: 


 Last Vital Signs











Temp  97.5 F L  05/23/18 06:57


 


Pulse  79   05/23/18 16:30


 


Resp  20   05/23/18 06:57


 


BP  116/87   05/23/18 16:30


 


Pulse Ox  100   05/22/18 05:52














- Labs


Result Diagrams: 


 05/21/18 23:11





 05/21/18 23:11





Attending/Attestation





- Attestation


I have personally seen and examined this patient.: Yes


I have fully participated in the care of the patient.: Yes


I have reviewed all pertinent clinical information: Yes


Notes (Text): 


Patient was seen and examined with the resident


Has eczema. H/O allergies and asthma


Discussed about skin care with the patient


I agree with the resident's documentation of the assessment and the plan.

## 2018-05-23 NOTE — PCM.PYCHPN
Psychiatric Progress Note





- Psychiatric Progress Note


Patient seen today, length of contact: 15 min


Patient Chief Complaint: 





I was feeling depressed.'


Problems Identified/Issues Discussed: 


Patient is a 48 year old AA female who presented to the ED with complaints of 

feeling depressed and auditory hallucinations and voices.





Patient seen and evaluated, chart reviewed and discussed with nurse. Patient 

reports feeling depressed, complaining of decreased sleep, energy, concentration

, and appetite. Patient says she participates in all activities. Denies feeling 

guilty. Denies suicidal ideations today. Patient denies hearing voices today. 

She continues to have an itch and rash on her neck. Medical team will see her 

about the itch and rash. 





Patient is compliant with medications and denies any side effects. 


Symptoms are improving but needs more time to stabilize. 


Support and psychoeducation given.





Medication Change: Yes


Medical Record Reviewed: Yes





Mental Status Examination





- Cognitive Function


Orientation: Person, Place, Situation, Time


Memory: Intact


Attention: WNL


Concentration: Poor


Association: WNL


Fund of Knowledge: Poor





- Mood


Mood: Depressed, Anxious





- Affect


Affect: Broad





- Speech


Speech: Soft





- Formal Thought Process


Formal Thought Process: Delusions, Paranoia





- Suicidal Ideation


Suicidal Ideation: No





- Homicidal Ideation


Homicidal Ideation: No





Goal/Treatment Plan





- Goal/Treatment Plan


Need for Continued Stay: Severe depression anxiety, Severe functional impairment


Progress Toward Problem(s) and Goals/Treatment Plan: 











Bipolar disorder mixed severe with psychotic features


R/O Schizoaffective disorder Bipolar type


-CBT   


-Psychoeducation


-Supportive therapy, group therapy, individual therapy


-Trazodone 50 mg by mouth daily at bedtime


-Gabapentin 100 mg po TID


-Lexapro 10 mg po daily





Opioid use disorder severe


-CBT


-Psychoeducation


-Supportive therapy, individual therapy


-Use MI for abstinence





Opioid withdrawal 


-CBT   


-Psychoeducation


-Supportive therapy, individual therapy


-Clonidine when necessary


-Methadone taper





Cocaine use disorder severe


-Psychoeducation


-Supportive therapy, individual therapy


-Use MI for abstinence





Skin Rash


-Consult medicine





Asthma


-Continue prescribed medications








- Smoking Cessation


Smoking Cessation Initiated: No

## 2018-05-24 RX ADMIN — CLOTRIMAZOLE AND BETAMETHASONE DIPROPIONATE SCH TUBE: 10; .5 CREAM TOPICAL at 12:58

## 2018-05-24 RX ADMIN — CLOTRIMAZOLE AND BETAMETHASONE DIPROPIONATE SCH: 10; .5 CREAM TOPICAL at 01:48

## 2018-05-24 RX ADMIN — ALBUTEROL SULFATE PRN PUFF: 90 AEROSOL, METERED RESPIRATORY (INHALATION) at 10:33

## 2018-05-24 NOTE — CP.PCM.PN
<Jeri Umaña - Last Filed: 05/24/18 11:21>





Subjective





- Date & Time of Evaluation


Date of Evaluation: 05/24/18


Time of Evaluation: 07:00





- Subjective


Subjective: 





Medicine Consult Note for Hospitalist Service- Dr. Conde





Patient was seen and examined at bedside. Patient reports rash is less itchy. 

Denied fever, chills, headache, shortness of breath, chest pain, abdominal pain

, n/v/d/c, or urinary symptoms. 





Objective





- Vital Signs/Intake and Output


Vital Signs (last 24 hours): 


 











Temp Pulse Resp BP Pulse Ox


 


 97.6 F   58 L  20   129/79   100 


 


 05/24/18 06:18  05/24/18 06:18  05/24/18 06:18  05/24/18 06:18  05/22/18 05:52











- Medications


Medications: 


 Current Medications





Al Hydrox/Mg Hydrox/Simethicone (Maalox 30 Ml)  30 ml PO TID PRN


   PRN Reason: Indigestion / Heartburn


Albuterol (Ventolin Hfa 90 Mcg/Actuation (8 G))  1 puff INH RQ6 PRN


   PRN Reason: Shortness of Breath


   Last Admin: 05/23/18 10:24 Dose:  1 u


Betamethasone/Clotrimazole (Lotrisone)  1 gm TOP Q12H [facility]


   Last Admin: 05/24/18 01:48 Dose:  Not Given


Clonidine HCl (Catapres)  0.1 mg PO Q8 PRN


   PRN Reason: COWS Score More or Equal to 5


Escitalopram Oxalate (Lexapro)  10 mg PO DAILY [facility]


   Last Admin: 05/23/18 10:36 Dose:  10 mg


Gabapentin (Neurontin)  100 mg PO TID [facility]


   Last Admin: 05/23/18 17:12 Dose:  100 mg


Hydroxyzine HCl (Atarax)  25 mg PO Q6H PRN


   PRN Reason: Anxiety


Loperamide HCl (Imodium)  2 mg PO Q8 PRN


   PRN Reason: Diarrhea


Methadone HCl (Methadone)  15 mg PO DAILY [facility]


   PRN Reason: Taper


   Stop: 05/26/18 09:59


   Last Admin: 05/23/18 09:34 Dose:  15 mg


Ondansetron HCl (Zofran Tab)  4 mg PO Q8 PRN


   PRN Reason: Nausea/Vomiting


Pseudoephedrine HCl (Sudafed Tab)  60 mg PO QID PRN


   PRN Reason: Nasal/Sinus Congestion


Trazodone HCl (Desyrel)  100 mg PO HS [facility]


   Last Admin: 05/23/18 21:19 Dose:  100 mg











- Labs


Labs: 


 





 05/21/18 23:11 





 05/21/18 23:11 











- Additional Findings


Additional findings: 





- Constitutional


Appears: No Acute Distress





- Head Exam


Head Exam: NORMAL INSPECTION, NORMOCEPHALIC





- Eye Exam


Eye Exam: EOMI, Normal appearance, PERRL


Pupil Exam: NORMAL ACCOMODATION





- ENT Exam


ENT Exam: Mucous Membranes Moist, Normal Exam





- Neck Exam


Additional comments: 





eczema noted on nape of neck, bilateral dorsal aspects of her forearm 





- Respiratory Exam


Respiratory Exam: Clear to Auscultation Bilateral, NORMAL BREATHING PATTERN.  

absent: Wheezes





- Cardiovascular Exam


Cardiovascular Exam: REGULAR RHYTHM, RRR





- GI/Abdominal Exam


GI & Abdominal Exam: Normal Bowel Sounds, Soft.  absent: Distended, Tenderness





- Extremities Exam


Extremities exam: Positive for: pedal pulses present.  Negative for: calf 

tenderness, pedal edema, tenderness


Additional comments: 








eczema noted on nape of neck, bilateral dorsal aspects of her forearm 





- Neurological Exam


Neurological exam: Alert, CN II-XII Intact, Oriented x3





- Psychiatric Exam


Psychiatric exam: Normal Affect, Normal Mood





- Skin


Skin Exam: Dry, Intact, Normal Color, Warm





Assessment and Plan





- Assessment and Plan (Free Text)


Assessment: 





This is 48 year old female with PMHx of Asthma, Heroine and Cocaine Use is 

admitted to for depression, auditory or visual hallucinations. Medicine was 

consulted for a rash that is localized on bilateral dorsal aspects of her 

forearms and on the posterior aspect of her neck. 


Plan: 





Eczema 


- Hx of Asthma 


- Continue with Lotrisone Q12H 


- Will continue to monitor progression, may need systemic steroids if does not 

improve 





Hx Depression


- All management as per Psychiatry





Hx of Heroine, Cocaine Use


- All management as per Psychiatry





Disposition: Patient is to continue with Lotrisone as needed. Instructed to 

keep areas clean and dry, then apply the cream twice a day. Medicine team will 

be signing off. Please reconsult if necessary. Thank you. 





DW Jeri Slade DO, PGY-1 








<Ronit Conde - Last Filed: 05/29/18 09:05>





Objective





- Vital Signs/Intake and Output


Vital Signs (last 24 hours): 


 











Temp Pulse Resp BP Pulse Ox


 


 98.3 F   65   18   113/76   97 


 


 05/29/18 06:23  05/29/18 06:23  05/29/18 06:23  05/29/18 06:23  05/29/18 06:23











- Medications


Medications: 


 Current Medications





Al Hydrox/Mg Hydrox/Simethicone (Maalox 30 Ml)  30 ml PO TID PRN


   PRN Reason: Indigestion / Heartburn


Albuterol (Ventolin Hfa 90 Mcg/Actuation (8 G))  1 puff INH RQ6 PRN


   PRN Reason: Shortness of Breath


   Last Admin: 05/28/18 09:20 Dose:  1 puff


Betamethasone/Clotrimazole (Lotrisone)  1 gm TOP Q12H [facility]


   Last Admin: 05/28/18 21:51 Dose:  1 applic


Clonidine HCl (Catapres)  0.1 mg PO Q8 PRN


   PRN Reason: COWS Score More or Equal to 5


Escitalopram Oxalate (Lexapro)  10 mg PO DAILY [facility]


   Last Admin: 05/28/18 09:18 Dose:  10 mg


Gabapentin (Neurontin)  300 mg PO BID [facility]


   Last Admin: 05/28/18 17:10 Dose:  300 mg


Hydroxyzine HCl (Atarax)  25 mg PO Q6H PRN


   PRN Reason: Anxiety


   Last Admin: 05/28/18 09:48 Dose:  25 mg


Loperamide HCl (Imodium)  2 mg PO Q8 PRN


   PRN Reason: Diarrhea


Multivitamins (Hexavitamin)  1 tab PO DAILY [facility]


   Last Admin: 05/28/18 09:18 Dose:  1 tab


Ondansetron HCl (Zofran Tab)  4 mg PO Q8 PRN


   PRN Reason: Nausea/Vomiting


Pseudoephedrine HCl (Sudafed Tab)  60 mg PO QID PRN


   PRN Reason: Nasal/Sinus Congestion


Quetiapine Fumarate (Seroquel)  50 mg PO HS [facility]


   Last Admin: 05/28/18 21:51 Dose:  50 mg


Trazodone HCl (Desyrel)  50 mg PO HS PRN


   PRN Reason: Insomnia


   Last Admin: 05/28/18 21:51 Dose:  50 mg











- Labs


Labs: 


 





 05/21/18 23:11 





 05/21/18 23:11 











Attending/Attestation





- Attestation


I have personally seen and examined this patient.: No


I have fully participated in the care of the patient.: Yes


I have reviewed all pertinent clinical information, including history, physical 

exam and plan: Yes


Notes (Text): 


I agree with the resident's documentation


05/29/18 09:05

## 2018-05-24 NOTE — PCM.PYCHPN
Psychiatric Progress Note





- Psychiatric Progress Note


Patient seen today, length of contact: 15 min


Patient Chief Complaint: 





I was feeling depressed.'


Problems Identified/Issues Discussed: 


Patient is a 48 year old AA female who presented to the ED with complaints of 

feeling depressed and auditory hallucinations and voices.





Patient seen and evaluated, chart reviewed and discussed with nurse. Patient 

reports feeling depressed, complaining of decreased sleep, energy, concentration

, and appetite. Patient says she participates in all activities. Denies feeling 

guilty. Denies suicidal ideations today. Patient denies hearing voices today. 

She continues to have an itch and rash on her neck. Medical team will see her 

about the itch and rash. 





Patient is compliant with medications and denies any side effects. 


Symptoms are improving but needs more time to stabilize. 


Support and psychoeducation given.





Medication Change: Yes (Start Seroquel)


Medical Record Reviewed: Yes





Mental Status Examination





- Cognitive Function


Orientation: Person, Place, Situation, Time


Memory: Intact


Attention: WNL


Concentration: Poor


Association: WNL


Fund of Knowledge: Poor





- Mood


Mood: Depressed, Anxious





- Affect


Affect: Broad





- Speech


Speech: Soft





- Formal Thought Process


Formal Thought Process: Delusions, Paranoia





- Suicidal Ideation


Suicidal Ideation: No





- Homicidal Ideation


Homicidal Ideation: No





Goal/Treatment Plan





- Goal/Treatment Plan


Need for Continued Stay: Severe depression anxiety, Severe functional impairment


Progress Toward Problem(s) and Goals/Treatment Plan: 











Bipolar disorder mixed severe with psychotic features


R/O Schizoaffective disorder Bipolar type


-CBT   


-Psychoeducation


-Supportive therapy, group therapy, individual therapy


-Trazodone 50 mg by mouth daily at bedtime


-Gabapentin 300 mg po BID


-Lexapro 10 mg po daily





Opioid use disorder severe


-CBT


-Psychoeducation


-Supportive therapy, individual therapy


-Use MI for abstinence





Opioid withdrawal 


-CBT   


-Psychoeducation


-Supportive therapy, individual therapy


-Clonidine when necessary


-Methadone taper





Cocaine use disorder severe


-Psychoeducation


-Supportive therapy, individual therapy


-Use MI for abstinence





Skin Rash


-Consult medicine





Asthma


-Continue prescribed medications

## 2018-05-25 RX ADMIN — CLOTRIMAZOLE AND BETAMETHASONE DIPROPIONATE SCH TUBE: 10; .5 CREAM TOPICAL at 01:56

## 2018-05-25 RX ADMIN — CLOTRIMAZOLE AND BETAMETHASONE DIPROPIONATE SCH: 10; .5 CREAM TOPICAL at 13:56

## 2018-05-25 RX ADMIN — Medication SCH TAB: at 09:48

## 2018-05-25 RX ADMIN — ALBUTEROL SULFATE PRN PUFF: 90 AEROSOL, METERED RESPIRATORY (INHALATION) at 10:10

## 2018-05-25 NOTE — PCM.PYCHPN
Psychiatric Progress Note





- Psychiatric Progress Note


Patient seen today, length of contact: 15 min


Patient Chief Complaint: 





I was feeling depressed.'


Problems Identified/Issues Discussed: 


Patient is a 48 year old AA female who presented to the ED with complaints of 

feeling depressed and auditory hallucinations and voices.





Patient seen and evaluated, chart reviewed and discussed with nurse. 


Patient reports feeling depressed, complaining of decreased sleep, energy, 

concentration, and appetite. Patient says she participates in all activities. 


Patient denies hearing voices today. She continues to have an itch and rash on 

her neck. Medical team will see her about the itch and rash. 





Patient is compliant with medications and denies any side effects. 


Symptoms are improving but needs more time to stabilize. 


Support and psychoeducation given.





Medication Change: Yes (Start Seroquel)


Medical Record Reviewed: Yes





Mental Status Examination





- Cognitive Function


Orientation: Person, Place, Situation, Time


Memory: Intact


Attention: WNL


Concentration: Poor


Association: WNL


Fund of Knowledge: Poor





- Mood


Mood: Depressed, Anxious





- Affect


Affect: Broad





- Speech


Speech: Soft





- Formal Thought Process


Formal Thought Process: Delusions, Paranoia





- Suicidal Ideation


Suicidal Ideation: No





- Homicidal Ideation


Homicidal Ideation: No





Goal/Treatment Plan





- Goal/Treatment Plan


Need for Continued Stay: Severe depression anxiety, Severe functional impairment


Progress Toward Problem(s) and Goals/Treatment Plan: 











Bipolar disorder mixed severe with psychotic features


R/O Schizoaffective disorder Bipolar type


-CBT   


-Psychoeducation


-Supportive therapy, group therapy, individual therapy


-Trazodone 50 mg by mouth daily at bedtime


-Gabapentin 300 mg po BID


-Lexapro 10 mg po daily





Opioid use disorder severe


-CBT


-Psychoeducation


-Supportive therapy, individual therapy


-Use MI for abstinence





Opioid withdrawal 


-CBT   


-Psychoeducation


-Supportive therapy, individual therapy


-Clonidine when necessary


-Methadone taper





Cocaine use disorder severe


-Psychoeducation


-Supportive therapy, individual therapy


-Use MI for abstinence





Skin Rash


-Consult medicine





Asthma


-Continue prescribed medications

## 2018-05-26 RX ADMIN — CLOTRIMAZOLE AND BETAMETHASONE DIPROPIONATE SCH TUBE: 10; .5 CREAM TOPICAL at 13:53

## 2018-05-26 RX ADMIN — ALBUTEROL SULFATE PRN PUFF: 90 AEROSOL, METERED RESPIRATORY (INHALATION) at 07:54

## 2018-05-26 RX ADMIN — Medication SCH TAB: at 10:17

## 2018-05-26 RX ADMIN — CLOTRIMAZOLE AND BETAMETHASONE DIPROPIONATE SCH: 10; .5 CREAM TOPICAL at 03:38

## 2018-05-27 RX ADMIN — CLOTRIMAZOLE AND BETAMETHASONE DIPROPIONATE SCH TUBE: 10; .5 CREAM TOPICAL at 01:12

## 2018-05-27 RX ADMIN — Medication SCH TAB: at 09:28

## 2018-05-27 RX ADMIN — CLOTRIMAZOLE AND BETAMETHASONE DIPROPIONATE SCH APPLIC: 10; .5 CREAM TOPICAL at 21:36

## 2018-05-28 RX ADMIN — CLOTRIMAZOLE AND BETAMETHASONE DIPROPIONATE SCH APPLIC: 10; .5 CREAM TOPICAL at 09:22

## 2018-05-28 RX ADMIN — ALBUTEROL SULFATE PRN PUFF: 90 AEROSOL, METERED RESPIRATORY (INHALATION) at 09:20

## 2018-05-28 RX ADMIN — CLOTRIMAZOLE AND BETAMETHASONE DIPROPIONATE SCH APPLIC: 10; .5 CREAM TOPICAL at 21:51

## 2018-05-28 RX ADMIN — Medication SCH TAB: at 09:18

## 2018-05-29 VITALS
HEART RATE: 65 BPM | OXYGEN SATURATION: 97 % | TEMPERATURE: 98.3 F | DIASTOLIC BLOOD PRESSURE: 76 MMHG | SYSTOLIC BLOOD PRESSURE: 113 MMHG | RESPIRATION RATE: 18 BRPM

## 2018-05-29 NOTE — PCM.PYCHDC
Mental Status Examination





- Mental Status Examination


Orientation: Person, Place, Situation, Time


Memory: Intact


Mood: Neutral


Affect: Constricted


Speech: Soft


Attention: WNL


Concentration: WNL


Association: WNL


Fund of Knowledge: WNL


Formal Thought Process: No Impairment


Suicidal Ideation: No


Current Homicidal Ideation?: No





Discharge Summary





- Discharge Note


Consultations:: List each consultation separately and include:  1. Reason for 

request.  2. Findings.  3. Follow-up


Summary of Hospital Course include:: 1. Description of specific treatment plan 

utilized for patients during their course of treatmen.  2. Summarize the time-

course for resolution of acute symptoms and/or regressed behaviors.  3. 

Describe issues identified and worked on during hospitalization.  4. Describe 

medication utilized.  5. Describe medical problems identified and treated.  6. 

Reassessment of suicide risk


Summary of Hospital Course: 








Pt is 48 year old AA female presented to the ED with complains of feeling 

depressed and auditory hallucinations and voices. 





The patient has a long history of heroin dependency and schizophrenia. The 

voices has been telling her yesterday to fly like batman, just fly away. 

Patient reports feeling frightened by the voices, but denies having suicidal 

thoughts. She did report having suicidal thoughts in the past however. 

Yesterday she reports having 4 bags of heroin intranasally. Normally she has 10 

bags of heroin a day. She is experiencing withdrawal symptoms including 

irritability, stomach cramps, nausea, diarrhea, and overall restless. She also 

complains of feeling itchy around her lips, neck, and arms. Patient reports 

having a depressed mood. She says he cannot sleep, doesnt have an appetite, 

cannot concentrate, feeling guilty of her actions, and not being interested in 

doing anything. She has tried rehab programs in [place] for Suboxone and in 

[place] ([facility]) for Methadone but claims she has stopped going for 

unknown reasons. She reports having using heroin for 20 years and smoking 

cocaine for 15 years. She also uses marijuana on occasion. She drinks 2 shots 

of alcohol and 2 beers usually a day. 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 

and drugs by being a prostitute. She was admitted last month with Major 

Depressive Disorder with suicidal ideations. After her last admission she went 

ot [facility] for a methadone rehab program. Patient also reports her parents 

experienced depression and anxiety. 





Past Medical History: Asthma, HTM, Mitral Valve Prolapse, 


Past Surgical History: CABG


Allergies: Denies








- Final Diagnosis (DSM 5)


Condition upon Discharge: GOOD


Disposition: HOME/ ROUTINE


Follow-up Treatment Plan: 











Bipolar disorder mixed severe with psychotic features


R/O Schizoaffective disorder Bipolar type


-CBT   


-Psychoeducation


-Supportive therapy, group therapy, individual therapy


-Trazodone 50 mg by mouth daily at bedtime


-Gabapentin 300 mg po BID


-Lexapro 10 mg po daily





Opioid use disorder severe


-CBT


-Psychoeducation


-Supportive therapy, individual therapy


-Use MI for abstinence





Opioid withdrawal 


-CBT   


-Psychoeducation


-Supportive therapy, individual therapy


-Clonidine when necessary


-Methadone taper





Cocaine use disorder severe


-Psychoeducation


-Supportive therapy, individual therapy


-Use MI for abstinence





Skin Rash


-Consult medicine





Asthma


-Continue prescribed medications





Prescriptions/Medication Reconciliation: 


Escitalopram [Lexapro] 10 mg PO DAILY #30 tab


QUEtiapine [SEROquel] 50 mg PO HS #30 tab


traZODone [Desyrel] 50 mg PO HS PRN #30 tab


 PRN Reason: Insomnia